# Patient Record
Sex: MALE | Race: WHITE | NOT HISPANIC OR LATINO | URBAN - METROPOLITAN AREA
[De-identification: names, ages, dates, MRNs, and addresses within clinical notes are randomized per-mention and may not be internally consistent; named-entity substitution may affect disease eponyms.]

---

## 2017-03-10 ENCOUNTER — OFFICE (AMBULATORY)
Dept: URBAN - METROPOLITAN AREA CLINIC 64 | Facility: CLINIC | Age: 57
End: 2017-03-10

## 2017-03-10 VITALS
DIASTOLIC BLOOD PRESSURE: 70 MMHG | WEIGHT: 186 LBS | SYSTOLIC BLOOD PRESSURE: 134 MMHG | HEART RATE: 93 BPM | HEIGHT: 70 IN

## 2017-03-10 DIAGNOSIS — K22.70 BARRETT'S ESOPHAGUS WITHOUT DYSPLASIA: ICD-10-CM

## 2017-03-10 DIAGNOSIS — R13.10 DYSPHAGIA, UNSPECIFIED: ICD-10-CM

## 2017-03-10 DIAGNOSIS — K21.9 GASTRO-ESOPHAGEAL REFLUX DISEASE WITHOUT ESOPHAGITIS: ICD-10-CM

## 2017-03-10 PROCEDURE — 99214 OFFICE O/P EST MOD 30 MIN: CPT | Performed by: NURSE PRACTITIONER

## 2017-03-10 RX ORDER — METOCLOPRAMIDE 5 MG/1
TABLET ORAL
Qty: 30 | Refills: 2 | Status: COMPLETED
Start: 2017-03-10 | End: 2017-03-14

## 2017-03-14 ENCOUNTER — ON CAMPUS - OUTPATIENT (AMBULATORY)
Dept: URBAN - METROPOLITAN AREA HOSPITAL 2 | Facility: HOSPITAL | Age: 57
End: 2017-03-14

## 2017-03-14 ENCOUNTER — OFFICE (AMBULATORY)
Dept: URBAN - METROPOLITAN AREA CLINIC 64 | Facility: CLINIC | Age: 57
End: 2017-03-14

## 2017-03-14 VITALS
SYSTOLIC BLOOD PRESSURE: 106 MMHG | DIASTOLIC BLOOD PRESSURE: 80 MMHG | TEMPERATURE: 97.9 F | SYSTOLIC BLOOD PRESSURE: 125 MMHG | OXYGEN SATURATION: 98 % | HEART RATE: 77 BPM | OXYGEN SATURATION: 95 % | HEART RATE: 75 BPM | HEART RATE: 69 BPM | SYSTOLIC BLOOD PRESSURE: 148 MMHG | HEART RATE: 74 BPM | RESPIRATION RATE: 16 BRPM | HEIGHT: 70 IN | OXYGEN SATURATION: 96 % | DIASTOLIC BLOOD PRESSURE: 83 MMHG | DIASTOLIC BLOOD PRESSURE: 85 MMHG | RESPIRATION RATE: 18 BRPM | WEIGHT: 183 LBS | HEART RATE: 63 BPM | DIASTOLIC BLOOD PRESSURE: 72 MMHG | SYSTOLIC BLOOD PRESSURE: 130 MMHG | DIASTOLIC BLOOD PRESSURE: 69 MMHG | OXYGEN SATURATION: 100 % | SYSTOLIC BLOOD PRESSURE: 127 MMHG

## 2017-03-14 DIAGNOSIS — K21.0 GASTRO-ESOPHAGEAL REFLUX DISEASE WITH ESOPHAGITIS: ICD-10-CM

## 2017-03-14 DIAGNOSIS — R13.10 DYSPHAGIA, UNSPECIFIED: ICD-10-CM

## 2017-03-14 DIAGNOSIS — K22.70 BARRETT'S ESOPHAGUS WITHOUT DYSPLASIA: ICD-10-CM

## 2017-03-14 DIAGNOSIS — K31.89 OTHER DISEASES OF STOMACH AND DUODENUM: ICD-10-CM

## 2017-03-14 LAB
GI HISTOLOGY: A. UNSPECIFIED: (no result)
GI HISTOLOGY: PDF REPORT: (no result)

## 2017-03-14 PROCEDURE — 43239 EGD BIOPSY SINGLE/MULTIPLE: CPT | Performed by: INTERNAL MEDICINE

## 2017-03-14 PROCEDURE — 88305 TISSUE EXAM BY PATHOLOGIST: CPT | Performed by: INTERNAL MEDICINE

## 2017-03-14 RX ORDER — RANITIDINE 300 MG/1
TABLET ORAL
Qty: 90 | Refills: 3 | Status: COMPLETED
End: 2017-04-21

## 2017-03-14 RX ORDER — METOCLOPRAMIDE 5 MG/1
TABLET ORAL
Qty: 30 | Refills: 2 | Status: COMPLETED
Start: 2017-03-10 | End: 2017-03-14

## 2017-03-14 RX ADMIN — PROPOFOL: 10 INJECTION, EMULSION INTRAVENOUS at 13:12

## 2017-04-21 ENCOUNTER — OFFICE (AMBULATORY)
Dept: URBAN - METROPOLITAN AREA CLINIC 64 | Facility: CLINIC | Age: 57
End: 2017-04-21

## 2017-04-21 VITALS
HEIGHT: 70 IN | DIASTOLIC BLOOD PRESSURE: 80 MMHG | HEART RATE: 88 BPM | WEIGHT: 188 LBS | SYSTOLIC BLOOD PRESSURE: 124 MMHG

## 2017-04-21 DIAGNOSIS — K22.70 BARRETT'S ESOPHAGUS WITHOUT DYSPLASIA: ICD-10-CM

## 2017-04-21 DIAGNOSIS — K21.9 GASTRO-ESOPHAGEAL REFLUX DISEASE WITHOUT ESOPHAGITIS: ICD-10-CM

## 2017-04-21 PROCEDURE — 99212 OFFICE O/P EST SF 10 MIN: CPT | Performed by: NURSE PRACTITIONER

## 2017-10-03 ENCOUNTER — HOSPITAL ENCOUNTER (OUTPATIENT)
Dept: FAMILY MEDICINE CLINIC | Facility: CLINIC | Age: 57
Setting detail: SPECIMEN
Discharge: HOME | End: 2017-10-03
Attending: HOSPITALIST | Admitting: HOSPITALIST

## 2017-10-03 LAB
ALBUMIN SERPL-MCNC: 4.4 G/DL (ref 3.5–4.8)
ALBUMIN/GLOB SERPL: 1.7 {RATIO} (ref 1–1.7)
ALP SERPL-CCNC: 67 IU/L (ref 32–91)
ALT SERPL-CCNC: 21 IU/L (ref 17–63)
ANION GAP SERPL CALC-SCNC: 10.8 MMOL/L (ref 10–20)
AST SERPL-CCNC: 21 IU/L (ref 15–41)
BASOPHILS # BLD AUTO: 0.1 10*3/UL (ref 0–0.2)
BASOPHILS NFR BLD AUTO: 1 % (ref 0–2)
BILIRUB SERPL-MCNC: 0.6 MG/DL (ref 0.3–1.2)
BUN SERPL-MCNC: 10 MG/DL (ref 8–20)
BUN/CREAT SERPL: 10 (ref 6.2–20.3)
CALCIUM SERPL-MCNC: 9.2 MG/DL (ref 8.9–10.3)
CHLORIDE SERPL-SCNC: 104 MMOL/L (ref 101–111)
CHOLEST SERPL-MCNC: 172 MG/DL
CHOLEST/HDLC SERPL: 4.5 {RATIO}
CONV CO2: 27 MMOL/L (ref 22–32)
CONV LDL CHOLESTEROL DIRECT: 106 MG/DL (ref 0–100)
CONV TOTAL PROTEIN: 7 G/DL (ref 6.1–7.9)
CREAT UR-MCNC: 1 MG/DL (ref 0.7–1.2)
DIFFERENTIAL METHOD BLD: (no result)
EOSINOPHIL # BLD AUTO: 0.1 10*3/UL (ref 0–0.3)
EOSINOPHIL # BLD AUTO: 2 % (ref 0–3)
ERYTHROCYTE [DISTWIDTH] IN BLOOD BY AUTOMATED COUNT: 12.7 % (ref 11.5–14.5)
FOLATE SERPL-MCNC: 21.2 NG/ML (ref 5.9–24.8)
GLOBULIN UR ELPH-MCNC: 2.6 G/DL (ref 2.5–3.8)
GLUCOSE SERPL-MCNC: 91 MG/DL (ref 65–99)
HCT VFR BLD AUTO: 47.5 % (ref 40–54)
HDLC SERPL-MCNC: 38 MG/DL
HGB BLD-MCNC: 16.3 G/DL (ref 14–18)
LDLC/HDLC SERPL: 2.8 {RATIO}
LIPID INTERPRETATION: ABNORMAL
LYMPHOCYTES # BLD AUTO: 2.3 10*3/UL (ref 0.8–4.8)
LYMPHOCYTES NFR BLD AUTO: 30 % (ref 18–42)
MCH RBC QN AUTO: 32.5 PG (ref 26–32)
MCHC RBC AUTO-ENTMCNC: 34.3 G/DL (ref 32–36)
MCV RBC AUTO: 94.9 FL (ref 80–94)
MONOCYTES # BLD AUTO: 0.8 10*3/UL (ref 0.1–1.3)
MONOCYTES NFR BLD AUTO: 10 % (ref 2–11)
NEUTROPHILS # BLD AUTO: 4.3 10*3/UL (ref 2.3–8.6)
NEUTROPHILS NFR BLD AUTO: 57 % (ref 50–75)
NRBC BLD AUTO-RTO: 0 /100{WBCS}
NRBC/RBC NFR BLD MANUAL: 0 10*3/UL
PLATELET # BLD AUTO: 157 10*3/UL (ref 150–450)
PMV BLD AUTO: 9.5 FL (ref 7.4–10.4)
POTASSIUM SERPL-SCNC: 3.8 MMOL/L (ref 3.6–5.1)
RBC # BLD AUTO: 5.01 10*6/UL (ref 4.6–6)
SODIUM SERPL-SCNC: 138 MMOL/L (ref 136–144)
T4 FREE SERPL-MCNC: 0.81 NG/DL (ref 0.58–1.64)
TRIGL SERPL-MCNC: 251 MG/DL
TSH SERPL-ACNC: 1.11 UIU/ML (ref 0.34–5.6)
VIT B12 SERPL-MCNC: 225 PG/ML (ref 180–914)
VLDLC SERPL CALC-MCNC: 28.1 MG/DL
WBC # BLD AUTO: 7.5 10*3/UL (ref 4.5–11.5)

## 2018-04-05 ENCOUNTER — OFFICE (AMBULATORY)
Dept: URBAN - METROPOLITAN AREA CLINIC 64 | Facility: CLINIC | Age: 58
End: 2018-04-05

## 2018-04-05 VITALS
WEIGHT: 178 LBS | HEART RATE: 59 BPM | SYSTOLIC BLOOD PRESSURE: 149 MMHG | HEIGHT: 70 IN | DIASTOLIC BLOOD PRESSURE: 77 MMHG

## 2018-04-05 DIAGNOSIS — K22.70 BARRETT'S ESOPHAGUS WITHOUT DYSPLASIA: ICD-10-CM

## 2018-04-05 DIAGNOSIS — K21.9 GASTRO-ESOPHAGEAL REFLUX DISEASE WITHOUT ESOPHAGITIS: ICD-10-CM

## 2018-04-05 PROCEDURE — 99213 OFFICE O/P EST LOW 20 MIN: CPT | Performed by: NURSE PRACTITIONER

## 2018-04-05 RX ORDER — ESOMEPRAZOLE MAGNESIUM 40 MG/1
40 CAPSULE, DELAYED RELEASE ORAL
Qty: 90 | Refills: 3 | Status: COMPLETED
Start: 2018-04-05 | End: 2022-11-01

## 2018-10-01 ENCOUNTER — HOSPITAL ENCOUNTER (OUTPATIENT)
Dept: MRI IMAGING | Facility: HOSPITAL | Age: 58
Discharge: HOME | End: 2018-10-01
Attending: HOSPITALIST | Admitting: HOSPITALIST

## 2018-10-15 ENCOUNTER — HOSPITAL ENCOUNTER (OUTPATIENT)
Dept: PHYSICAL THERAPY | Facility: HOSPITAL | Age: 58
Setting detail: RECURRING SERIES
Discharge: HOME | End: 2018-10-17
Attending: HOSPITALIST | Admitting: HOSPITALIST

## 2019-04-19 ENCOUNTER — OFFICE (AMBULATORY)
Dept: URBAN - METROPOLITAN AREA CLINIC 64 | Facility: CLINIC | Age: 59
End: 2019-04-19

## 2019-04-19 VITALS
DIASTOLIC BLOOD PRESSURE: 77 MMHG | WEIGHT: 180 LBS | HEIGHT: 70 IN | HEART RATE: 72 BPM | SYSTOLIC BLOOD PRESSURE: 137 MMHG

## 2019-04-19 DIAGNOSIS — K22.70 BARRETT'S ESOPHAGUS WITHOUT DYSPLASIA: ICD-10-CM

## 2019-04-19 DIAGNOSIS — K21.9 GASTRO-ESOPHAGEAL REFLUX DISEASE WITHOUT ESOPHAGITIS: ICD-10-CM

## 2019-04-19 DIAGNOSIS — Z13.820 ENCOUNTER FOR SCREENING FOR OSTEOPOROSIS: ICD-10-CM

## 2019-04-19 PROCEDURE — 99213 OFFICE O/P EST LOW 20 MIN: CPT | Performed by: NURSE PRACTITIONER

## 2019-04-19 RX ORDER — RANITIDINE 300 MG/1
TABLET ORAL
Qty: 90 | Refills: 3 | Status: COMPLETED
End: 2020-10-15

## 2019-04-19 RX ORDER — ESOMEPRAZOLE MAGNESIUM 40 MG/1
40 CAPSULE, DELAYED RELEASE ORAL
Qty: 90 | Refills: 3 | Status: COMPLETED
Start: 2018-04-05 | End: 2022-11-01

## 2019-05-28 ENCOUNTER — HOSPITAL ENCOUNTER (OUTPATIENT)
Dept: FAMILY MEDICINE CLINIC | Facility: CLINIC | Age: 59
Setting detail: SPECIMEN
Discharge: HOME | End: 2019-05-28
Attending: HOSPITALIST | Admitting: HOSPITALIST

## 2019-05-28 ENCOUNTER — CONVERSION ENCOUNTER (OUTPATIENT)
Dept: FAMILY MEDICINE CLINIC | Facility: CLINIC | Age: 59
End: 2019-05-28

## 2019-05-28 LAB
ALBUMIN SERPL-MCNC: 4 G/DL (ref 3.5–4.8)
ALBUMIN/GLOB SERPL: 1.4 {RATIO} (ref 1–1.7)
ALP SERPL-CCNC: 73 IU/L (ref 32–91)
ALT SERPL-CCNC: 38 IU/L (ref 17–63)
ANION GAP SERPL CALC-SCNC: 15.4 MMOL/L (ref 10–20)
AST SERPL-CCNC: 28 IU/L (ref 15–41)
BASOPHILS # BLD AUTO: 0 10*3/UL (ref 0–0.2)
BASOPHILS NFR BLD AUTO: 1 % (ref 0–2)
BILIRUB SERPL-MCNC: 0.8 MG/DL (ref 0.3–1.2)
BUN SERPL-MCNC: 17 MG/DL (ref 8–20)
BUN/CREAT SERPL: 17 (ref 6.2–20.3)
CALCIUM SERPL-MCNC: 9 MG/DL (ref 8.9–10.3)
CHLORIDE SERPL-SCNC: 103 MMOL/L (ref 101–111)
CHOLEST SERPL-MCNC: 129 MG/DL
CHOLEST/HDLC SERPL: 4.5 {RATIO}
CONV CO2: 24 MMOL/L (ref 22–32)
CONV LDL CHOLESTEROL DIRECT: 79 MG/DL (ref 0–100)
CONV TOTAL PROTEIN: 6.8 G/DL (ref 6.1–7.9)
CREAT UR-MCNC: 1 MG/DL (ref 0.7–1.2)
DIFFERENTIAL METHOD BLD: (no result)
EOSINOPHIL # BLD AUTO: 0.1 10*3/UL (ref 0–0.3)
EOSINOPHIL # BLD AUTO: 1 % (ref 0–3)
ERYTHROCYTE [DISTWIDTH] IN BLOOD BY AUTOMATED COUNT: 12.4 % (ref 11.5–14.5)
FOLATE SERPL-MCNC: >24.8 NG/ML (ref 5.9–24.8)
GLOBULIN UR ELPH-MCNC: 2.8 G/DL (ref 2.5–3.8)
GLUCOSE SERPL-MCNC: 85 MG/DL (ref 65–99)
HCT VFR BLD AUTO: 43 % (ref 40–54)
HDLC SERPL-MCNC: 29 MG/DL
HGB BLD-MCNC: 14.9 G/DL (ref 14–18)
LDLC/HDLC SERPL: 2.7 {RATIO}
LIPID INTERPRETATION: ABNORMAL
LYMPHOCYTES # BLD AUTO: 1.9 10*3/UL (ref 0.8–4.8)
LYMPHOCYTES NFR BLD AUTO: 40 % (ref 18–42)
MCH RBC QN AUTO: 32.7 PG (ref 26–32)
MCHC RBC AUTO-ENTMCNC: 34.6 G/DL (ref 32–36)
MCV RBC AUTO: 94.4 FL (ref 80–94)
MONOCYTES # BLD AUTO: 0.7 10*3/UL (ref 0.1–1.3)
MONOCYTES NFR BLD AUTO: 14 % (ref 2–11)
NEUTROPHILS # BLD AUTO: 2.1 10*3/UL (ref 2.3–8.6)
NEUTROPHILS NFR BLD AUTO: 44 % (ref 50–75)
NRBC BLD AUTO-RTO: 0 /100{WBCS}
NRBC/RBC NFR BLD MANUAL: 0 10*3/UL
PLATELET # BLD AUTO: 164 10*3/UL (ref 150–450)
PMV BLD AUTO: 9.4 FL (ref 7.4–10.4)
POTASSIUM SERPL-SCNC: 4.4 MMOL/L (ref 3.6–5.1)
PSA SERPL-MCNC: 0.91 NG/ML (ref 0–4)
RBC # BLD AUTO: 4.56 10*6/UL (ref 4.6–6)
SODIUM SERPL-SCNC: 138 MMOL/L (ref 136–144)
TRIGL SERPL-MCNC: 142 MG/DL
TSH SERPL-ACNC: 1.75 UIU/ML (ref 0.34–5.6)
VIT B12 SERPL-MCNC: 236 PG/ML (ref 180–914)
VLDLC SERPL CALC-MCNC: 21.5 MG/DL
WBC # BLD AUTO: 4.8 10*3/UL (ref 4.5–11.5)

## 2019-06-04 VITALS
RESPIRATION RATE: 8 BRPM | WEIGHT: 175 LBS | HEART RATE: 60 BPM | SYSTOLIC BLOOD PRESSURE: 130 MMHG | DIASTOLIC BLOOD PRESSURE: 88 MMHG

## 2019-06-06 NOTE — PROGRESS NOTES
Vital Signs:    Patient Profile:    59 Years Old Male  Height:     70 inches  Weight:     175 pounds  BMI:        25.11     Temp:       98.0 degrees F oral  Pulse rate: 60 / minute  Pulse rhythm:   regular  Resp:       8 per minute  BP Sittin / 88  (right arm)    Cuff size:  large      Problems: Active problems were reviewed with the patient during this visit.  Medications: Medications were reviewed with the patient during this visit.  Allergies: Allergies were reviewed with the patient during this visit.  No Known Allergy.        Vitals Entered By: [facility]      Visit Type:  Annual Physical  Referring Provider:  Zoran Ontiveros MD  Primary Provider:  Zoran Ontiveros MD      History of Present Illness:         The patient comes in today for a Annual exam.  Here for physical. Doing well, glaucoma worse and on drops.           When questioned about any new concerns, the patient voices concerns with nothing in particular.  When asked about their health maintenence items, they report their Colonoscopy is UTD and PSA is UTD.           The ROS is negative for chest pain, SOA, fatique, dizzyness, headaches, fever, nausea and vomiting.        Past Medical History:     Reviewed history from 2016 and no changes required:        Musculoskeletal Hx: polio        Benign prostatic hypertrophy        Hyperlipidemia        GERD        Peptic ulcer disease        Allergies        Polio as a child        glaucoma    Family History Summary:      Reviewed history Last on 2018 and no changes required:2019      General Comments - FH:  FH Heart Disease  Brain atrophy in brother diseased    Social History:     Reviewed history from 10/03/2017 and no changes required:        Patient has never smoked.        Alcohol Use: Y        Drug Use: N                Previous Tobacco Use: Signed On - 2018  Smoked Tobacco Use:  Never smoker  Drug use:  no  Caffeine use:  1 drinks per day    Previous Alcohol Use:  Signed On - 09/25/2018  Alcohol use:  yes     Type:  social    Colonoscopy History:     Date of Last Colonoscopy:  05/20/2016        Physical Exam   Weight:  177  BP:  130/88 mm HG    Medication List:  FOSAMAX 70 MG ORAL TABLET (ALENDRONATE SODIUM) take one weekly  MELOXICAM 15MG TABLETS (MELOXICAM) TAKE 1 TABLET BY MOUTH DAILY  VITAMIN D (ERGOCALCIFEROL) 83962 UNIT ORAL CAPSULE (ERGOCALCIFEROL) one tablet by mouth weekly  ALLEGRA ALLERGY 180 MG ORAL TABLET (FEXOFENADINE HCL) Take 1 tablet by mouth daily  MONTELUKAST 10MG TABLETS (MONTELUKAST SODIUM) TAKE 1 TABLET BY MOUTH EVERY DAY  ATORVASTATIN 20MG TABLETS (ATORVASTATIN CALCIUM) TAKE 1 TABLET BY MOUTH DAILY        Risk Factors  Tobacco Use: Never smoker  Illicit Drug use: no      Physical Examination   General Appearance   In no acute distress.  Alert & oriented.  Behavior and affect appropriate to situation  Skin   No suspicious lesions, moles or rashes . Turgor good.  HEENT   PERRLA, EOMI, TM's normal.  Pharynx clear  Neck   Supple.  No adenopathy  Cardiovascular   Regular rate and rhythm  Lungs   Clear to auscultation  Abdomen   Soft, non-tender.  Bowel sounds present.  No hepatosplenomegaly  Back   degenerative changes  Musculoskeletal   OA changes, degenerative changes  Neurologic   CN grossly intact  Psych   Alert and cooperative; normal mood and affect; normal attention span and concentration        Impression & Recommendations:    Problem # 1:  Preventive Health Care Exam (ICD-V70.0) (QSZ23-W19.00)  This patient overall looks good related to their annual checkup.  Problem areas were addressed and they were encouraged to continue good lifestyle habits and behaviors.    Orders:  [facility] CBC W/DIFF; PATH REVIEW IF INDICATED (CBC)  [facility] LIPID PANEL (LIPID)  [facility] PSA TOTAL (SCREENING) (PSA)  [facility] COMPREHENSIVE METABOLIC PANEL (CMP) (MPC)  [facility] VITAMIN B12 (B12)  [facility] FOLATE (FOL)  [facility] THYROID STIMULATING HORMONE (TSH) (TSH)      Problem # 2:  HYPERLIPIDEMIA (ICD-272.4)  (MYG96-E01.5)    His updated medication list for this problem includes:     Atorvastatin 20mg Tablets (Atorvastatin calcium) ..... Take 1 tablet by mouth daily    Labs Reviewed:  Chol: 172 (10/03/2017)   HDL: 38 (10/03/2017)   LDL: 106 (10/03/2017)     SGOT: 21 (10/03/2017)   SGPT: 21 (10/03/2017)    Orders:  [facility] CBC W/DIFF; PATH REVIEW IF INDICATED (CBC)  [facility] LIPID PANEL (LIPID)  [facility] PSA TOTAL (SCREENING) (PSA)  [facility] COMPREHENSIVE METABOLIC PANEL (CMP) (MPC)  [facility] VITAMIN B12 (B12)  [facility] FOLATE (FOL)  [facility] THYROID STIMULATING HORMONE (TSH) (TSH)      Problem # 3:  GERD (ICD-530.11) (BEM66-E24.0)  continue meds    Problem # 4:  Beltran's esophagus, hx of (ICD-V12.79) (WZZ39-D62.19)    Orders:  [facility] CBC W/DIFF; PATH REVIEW IF INDICATED (CBC)  [facility] LIPID PANEL (LIPID)  [facility] PSA TOTAL (SCREENING) (PSA)  [facility] COMPREHENSIVE METABOLIC PANEL (CMP) (MPC)  [facility] VITAMIN B12 (B12)  [facility] FOLATE (FOL)  [facility] THYROID STIMULATING HORMONE (TSH) (TSH)                    Medication Administration    Orders Added:  1)  Preventive, Est, (40-64) [21245]  2)  [facility] CBC W/DIFF; PATH REVIEW IF INDICATED [CBC]  3)  [facility] LIPID PANEL [LIPID]  4)  [facility] PSA TOTAL (SCREENING) [PSA]  5)  [facility] COMPREHENSIVE METABOLIC PANEL (CMP) [MPC]  6)  [facility] VITAMIN B12 [B12]  7)  [facility] FOLATE [FOL]  8)  [facility] THYROID STIMULATING HORMONE (TSH) [TSH]        Electronically signed by Zoran Ontiveros MD on 05/28/2019 at 3:33 PM  ________________________________________________________________________       Disclaimer: Converted Note message may not contain all data elements that existed in the legacy source system. Please see [employer] for the original note details.

## 2019-07-25 ENCOUNTER — OFFICE VISIT (OUTPATIENT)
Dept: FAMILY MEDICINE CLINIC | Facility: CLINIC | Age: 59
End: 2019-07-25

## 2019-07-25 VITALS
WEIGHT: 174.4 LBS | DIASTOLIC BLOOD PRESSURE: 80 MMHG | OXYGEN SATURATION: 97 % | HEIGHT: 70 IN | RESPIRATION RATE: 16 BRPM | TEMPERATURE: 98.1 F | SYSTOLIC BLOOD PRESSURE: 112 MMHG | HEART RATE: 73 BPM | BODY MASS INDEX: 24.97 KG/M2

## 2019-07-25 DIAGNOSIS — M25.512 ACUTE PAIN OF LEFT SHOULDER: Primary | ICD-10-CM

## 2019-07-25 PROCEDURE — 99213 OFFICE O/P EST LOW 20 MIN: CPT | Performed by: NURSE PRACTITIONER

## 2019-07-25 RX ORDER — MONTELUKAST SODIUM 10 MG/1
TABLET ORAL EVERY 24 HOURS
COMMUNITY
Start: 2018-04-26

## 2019-07-25 RX ORDER — LATANOPROST 50 UG/ML
SOLUTION/ DROPS OPHTHALMIC
COMMUNITY
Start: 2019-07-24

## 2019-07-25 RX ORDER — FEXOFENADINE HCL 180 MG/1
TABLET ORAL
COMMUNITY
Start: 2016-02-09

## 2019-07-25 RX ORDER — ALENDRONATE SODIUM 70 MG/1
70 TABLET ORAL
COMMUNITY
End: 2019-10-27

## 2019-07-25 RX ORDER — METHYLPREDNISOLONE 4 MG/1
TABLET ORAL
Qty: 21 TABLET | Refills: 0 | Status: SHIPPED | OUTPATIENT
Start: 2019-07-25 | End: 2019-10-27

## 2019-07-25 RX ORDER — MELOXICAM 15 MG/1
TABLET ORAL EVERY 24 HOURS
COMMUNITY
Start: 2019-04-22 | End: 2019-07-25

## 2019-07-25 RX ORDER — DICLOFENAC SODIUM 75 MG/1
75 TABLET, DELAYED RELEASE ORAL 2 TIMES DAILY
Qty: 180 TABLET | Refills: 1 | Status: SHIPPED | OUTPATIENT
Start: 2019-07-25 | End: 2020-01-21

## 2019-07-25 RX ORDER — RANITIDINE 150 MG/1
150 TABLET ORAL 2 TIMES DAILY
COMMUNITY

## 2019-07-25 RX ORDER — ATORVASTATIN CALCIUM 20 MG/1
20 TABLET, FILM COATED ORAL DAILY
Refills: 0 | COMMUNITY
Start: 2019-07-21 | End: 2019-11-18 | Stop reason: SDUPTHER

## 2019-07-25 RX ORDER — ESOMEPRAZOLE MAGNESIUM 40 MG/1
CAPSULE, DELAYED RELEASE ORAL
Refills: 3 | COMMUNITY
Start: 2019-04-19

## 2019-07-25 NOTE — PROGRESS NOTES
"Ines Hernandez is a 59 y.o. male.     Chief Complaint   Patient presents with   • Shoulder Pain     left       /80 (BP Location: Left arm, Patient Position: Sitting, Cuff Size: Adult)   Pulse 73   Temp 98.1 °F (36.7 °C) (Oral)   Resp 16   Ht 177.8 cm (70\")   Wt 79.1 kg (174 lb 6.4 oz)   SpO2 97%   BMI 25.02 kg/m²     BP Readings from Last 3 Encounters:   07/25/19 112/80   05/28/19 130/88   09/25/18 140/90       Wt Readings from Last 3 Encounters:   07/25/19 79.1 kg (174 lb 6.4 oz)   05/28/19 79.4 kg (174 lb 16 oz)   09/25/18 80.3 kg (176 lb 16 oz)       Pt is coming in today with c/o left shoulder pain x 3 months or so. No specific injuries. Stretching and lifting makes pain worse.  Some limitation with ROM.   Had similar symptoms on right shoulder last fall and was started on mobic and that helped. Restarted taking it about 1 month ago, but stopped it about 2 weeks ago. Didn't notice much relief while taking it.  When he was seen last fall he was referred to Dr. Reina, but never saw him. Had MRI of right shoulder and it showed some small tears.  No numbness or tingling in arm.   Pain is not constant  Pain is 5/10       The following portions of the patient's history were reviewed and updated as appropriate: allergies, current medications, past family history, past medical history, past social history, past surgical history and problem list.    Review of Systems   Musculoskeletal: Positive for arthralgias and bursitis. Negative for neck pain and neck stiffness.   Neurological: Negative for numbness.       Objective   Physical Exam   Constitutional: He is oriented to person, place, and time. He appears well-developed and well-nourished.   Eyes: Pupils are equal, round, and reactive to light.   Cardiovascular: Normal rate and regular rhythm.   Pulmonary/Chest: Effort normal and breath sounds normal.   Musculoskeletal:        Left shoulder: He exhibits decreased range of motion and " tenderness. He exhibits no bony tenderness, no swelling and no crepitus.   Neurological: He is alert and oriented to person, place, and time.       Assessment/Plan   Monie was seen today for shoulder pain.    Diagnoses and all orders for this visit:    Acute pain of left shoulder  -     methylPREDNISolone (MEDROL, ANJELICA,) 4 MG tablet; Take as directed on package instructions.  -     diclofenac (VOLTAREN) 75 MG EC tablet; Take 1 tablet by mouth 2 (Two) Times a Day for 180 days.    During this office visit, we discussed the pertinent aspects of the visit and treatment recommendations. Pt verbalizes understanding. Follow up was discussed. Patient was given the opportunity to ask questions and discuss other concerns.

## 2019-07-25 NOTE — PATIENT INSTRUCTIONS
Medrol dose pack and then start diclofenac twice a day  If no improvement will get MRI, then poss referral to ortho (Dr. Reina)      Shoulder Pain  Many things can cause shoulder pain, including:  · An injury to the area.  · Overuse of the shoulder.  · Arthritis.    The source of the pain can be:  · Inflammation.  · An injury to the shoulder joint.  · An injury to a tendon, ligament, or bone.    Follow these instructions at home:  Take these actions to help with your pain:  · Squeeze a soft ball or a foam pad as much as possible. This helps to keep the shoulder from swelling. It also helps to strengthen the arm.  · Take over-the-counter and prescription medicines only as told by your health care provider.  · If directed, apply ice to the area:  ? Put ice in a plastic bag.  ? Place a towel between your skin and the bag.  ? Leave the ice on for 20 minutes, 2-3 times per day. Stop applying ice if it does not help with the pain.  · If you were given a shoulder sling or immobilizer:  ? Wear it as told.  ? Remove it to shower or bathe.  ? Move your arm as little as possible, but keep your hand moving to prevent swelling.    Contact a health care provider if:  · Your pain gets worse.  · Your pain is not relieved with medicines.  · New pain develops in your arm, hand, or fingers.  Get help right away if:  · Your arm, hand, or fingers:  ? Tingle.  ? Become numb.  ? Become swollen.  ? Become painful.  ? Turn white or blue.  This information is not intended to replace advice given to you by your health care provider. Make sure you discuss any questions you have with your health care provider.  Document Released: 09/27/2006 Document Revised: 08/13/2017 Document Reviewed: 04/11/2016  [employer] Interactive Patient Education © 2019 [employer]

## 2019-11-18 RX ORDER — ATORVASTATIN CALCIUM 20 MG/1
TABLET, FILM COATED ORAL
Qty: 90 TABLET | Refills: 0 | Status: SHIPPED | OUTPATIENT
Start: 2019-11-18 | End: 2020-02-11 | Stop reason: SDUPTHER

## 2020-02-11 RX ORDER — ATORVASTATIN CALCIUM 20 MG/1
20 TABLET, FILM COATED ORAL DAILY
Qty: 90 TABLET | Refills: 0 | Status: SHIPPED | OUTPATIENT
Start: 2020-02-11 | End: 2020-05-18

## 2020-04-15 ENCOUNTER — OFFICE VISIT (OUTPATIENT)
Dept: FAMILY MEDICINE CLINIC | Facility: CLINIC | Age: 60
End: 2020-04-15

## 2020-04-15 VITALS
DIASTOLIC BLOOD PRESSURE: 84 MMHG | TEMPERATURE: 98 F | BODY MASS INDEX: 26.92 KG/M2 | OXYGEN SATURATION: 98 % | WEIGHT: 188 LBS | RESPIRATION RATE: 8 BRPM | HEIGHT: 70 IN | SYSTOLIC BLOOD PRESSURE: 140 MMHG | HEART RATE: 90 BPM

## 2020-04-15 DIAGNOSIS — M85.80 OSTEOPENIA, UNSPECIFIED LOCATION: ICD-10-CM

## 2020-04-15 DIAGNOSIS — R10.11 RUQ PAIN: Primary | ICD-10-CM

## 2020-04-15 PROCEDURE — 99213 OFFICE O/P EST LOW 20 MIN: CPT | Performed by: NURSE PRACTITIONER

## 2020-04-15 RX ORDER — ALENDRONATE SODIUM 70 MG/1
70 TABLET ORAL
COMMUNITY
End: 2020-04-15 | Stop reason: SDUPTHER

## 2020-04-15 RX ORDER — ALENDRONATE SODIUM 70 MG/1
70 TABLET ORAL
Qty: 12 TABLET | Refills: 0 | Status: SHIPPED | OUTPATIENT
Start: 2020-04-15 | End: 2020-06-30

## 2020-04-15 NOTE — PROGRESS NOTES
"Ines Hernandez is a 60 y.o. male.     Chief Complaint   Patient presents with   • gallblader       /84 (BP Location: Left arm, Patient Position: Sitting, Cuff Size: Large Adult)   Pulse 90   Temp 98 °F (36.7 °C) (Oral)   Resp 8   Ht 177.8 cm (70\")   Wt 85.3 kg (188 lb)   SpO2 98%   BMI 26.98 kg/m²     BP Readings from Last 3 Encounters:   04/15/20 140/84   10/27/19 180/69   07/25/19 112/80       Wt Readings from Last 3 Encounters:   04/15/20 85.3 kg (188 lb)   10/27/19 79.7 kg (175 lb 12.8 oz)   07/25/19 79.1 kg (174 lb 6.4 oz)       Pt comes in today with c/o poss gallbladder issues.  Has been having dull pain in RUQ for about 6 months. However, he has had 2 significant attacks in the past 2 months. Most recent \"attack\" was on 3/28 and lasted about 3 hours. Prior to attack he had eaten pizza. No N/V. Pain was about 7-8/10. Nothing improved it. No bowel changes. Pain radiated up to neck.     Pt is also needing refill on fosamax weekly. Dexa scan was done about 9 months ago.        The following portions of the patient's history were reviewed and updated as appropriate: allergies, current medications, past family history, past medical history, past social history, past surgical history and problem list.    Review of Systems   Gastrointestinal: Positive for abdominal pain. Negative for abdominal distention, constipation, diarrhea, nausea, vomiting, GERD and indigestion.       Objective   Physical Exam   Constitutional: He is oriented to person, place, and time. He appears well-developed and well-nourished.   Eyes: Pupils are equal, round, and reactive to light.   Cardiovascular: Normal rate and regular rhythm.   Pulmonary/Chest: Effort normal and breath sounds normal.   Abdominal: Soft. Normal appearance and bowel sounds are normal. There is tenderness in the right upper quadrant.   Neurological: He is alert and oriented to person, place, and time.         Diagnoses and all orders for this " visit:    1. RUQ pain (Primary)  -     US Gallbladder; Future    2. Osteopenia, unspecified location  -     alendronate (FOSAMAX) 70 MG tablet; Take 1 tablet by mouth Every 7 (Seven) Days.  Dispense: 12 tablet; Refill: 0      During this office visit, we discussed the pertinent aspects of the visit and treatment recommendations. Pt verbalizes understanding. Follow up was discussed. Patient was given the opportunity to ask questions and discuss other concerns.     Return if symptoms worsen or fail to improve.

## 2020-04-16 ENCOUNTER — TELEPHONE (OUTPATIENT)
Dept: FAMILY MEDICINE CLINIC | Facility: CLINIC | Age: 60
End: 2020-04-16

## 2020-04-16 NOTE — TELEPHONE ENCOUNTER
WAS INSTRUCTED BY COLE TO SEND A NOTE TO THE CLINICAL STAFF REGARDING THE STATUS OF THIS GALLBLADDER ULTRASOUND.     WIFE WAYNE WAS CALLING TO CHECK WHY THE ORDER HAS NOT BEEN FAXED TO THE FACILITY THAT HE IS GOING TO.      SIDE NOTE REFERRAL SAYS ROUTINE IS THIS SUPPOSED TO BE URGENT OR EMERGENT       WIFE WAYNE CAN BE REACHED [phone]

## 2020-04-28 DIAGNOSIS — R10.11 RUQ PAIN: ICD-10-CM

## 2020-04-29 ENCOUNTER — TELEPHONE (OUTPATIENT)
Dept: FAMILY MEDICINE CLINIC | Facility: CLINIC | Age: 60
End: 2020-04-29

## 2020-04-29 DIAGNOSIS — R10.11 RUQ PAIN: Primary | ICD-10-CM

## 2020-04-29 NOTE — PROGRESS NOTES
Please let pt know that there are some changes to both gallbladder and liver on US. I want to refer him to general surgery for the gallbladder and to [facility] for liver disease (fatty liver).   Refer to Dr. Villarreal (general surgery) and Dr. Hanna at [facility].

## 2020-04-29 NOTE — TELEPHONE ENCOUNTER
----- Message from JOBY Peralta sent at 4/29/2020  8:32 AM EDT -----  Please let pt know that there are some changes to both gallbladder and liver on US. I want to refer him to general surgery for the gallbladder and to [facility] for liver disease (fatty liver).   Refer to Dr. Villarreal (general surgery) and Dr. Hanna at [facility].

## 2020-04-30 ENCOUNTER — TELEHEALTH PROVIDED OTHER THAN IN PATIENT'S HOME (AMBULATORY)
Dept: URBAN - METROPOLITAN AREA TELEHEALTH 4 | Facility: TELEHEALTH | Age: 60
End: 2020-04-30

## 2020-04-30 VITALS — HEIGHT: 70 IN

## 2020-04-30 DIAGNOSIS — R10.11 RIGHT UPPER QUADRANT PAIN: ICD-10-CM

## 2020-04-30 DIAGNOSIS — K76.9 LIVER DISEASE, UNSPECIFIED: ICD-10-CM

## 2020-04-30 PROCEDURE — 99213 OFFICE O/P EST LOW 20 MIN: CPT | Mod: 95 | Performed by: INTERNAL MEDICINE

## 2020-05-18 RX ORDER — ATORVASTATIN CALCIUM 20 MG/1
20 TABLET, FILM COATED ORAL DAILY
Qty: 90 TABLET | Refills: 0 | Status: SHIPPED | OUTPATIENT
Start: 2020-05-18 | End: 2020-08-24

## 2020-06-30 DIAGNOSIS — M85.80 OSTEOPENIA, UNSPECIFIED LOCATION: ICD-10-CM

## 2020-06-30 RX ORDER — ALENDRONATE SODIUM 70 MG/1
TABLET ORAL
Qty: 12 TABLET | Refills: 0 | Status: SHIPPED | OUTPATIENT
Start: 2020-06-30

## 2020-08-24 RX ORDER — ATORVASTATIN CALCIUM 20 MG/1
20 TABLET, FILM COATED ORAL DAILY
Qty: 90 TABLET | Refills: 0 | Status: SHIPPED | OUTPATIENT
Start: 2020-08-24 | End: 2020-11-16

## 2020-10-15 ENCOUNTER — OFFICE (AMBULATORY)
Dept: URBAN - METROPOLITAN AREA CLINIC 64 | Facility: CLINIC | Age: 60
End: 2020-10-15

## 2020-10-15 VITALS
WEIGHT: 179 LBS | DIASTOLIC BLOOD PRESSURE: 88 MMHG | SYSTOLIC BLOOD PRESSURE: 121 MMHG | HEIGHT: 70 IN | HEART RATE: 93 BPM

## 2020-10-15 DIAGNOSIS — K21.9 GASTRO-ESOPHAGEAL REFLUX DISEASE WITHOUT ESOPHAGITIS: ICD-10-CM

## 2020-10-15 DIAGNOSIS — K76.9 LIVER DISEASE, UNSPECIFIED: ICD-10-CM

## 2020-10-15 DIAGNOSIS — K22.70 BARRETT'S ESOPHAGUS WITHOUT DYSPLASIA: ICD-10-CM

## 2020-10-15 PROCEDURE — 99213 OFFICE O/P EST LOW 20 MIN: CPT | Performed by: NURSE PRACTITIONER

## 2020-11-16 RX ORDER — ATORVASTATIN CALCIUM 20 MG/1
20 TABLET, FILM COATED ORAL DAILY
Qty: 90 TABLET | Refills: 0 | Status: SHIPPED | OUTPATIENT
Start: 2020-11-16

## 2021-02-21 RX ORDER — ATORVASTATIN CALCIUM 20 MG/1
20 TABLET, FILM COATED ORAL DAILY
Qty: 90 TABLET | Refills: 0 | OUTPATIENT
Start: 2021-02-21

## 2021-10-25 ENCOUNTER — OFFICE (AMBULATORY)
Dept: URBAN - METROPOLITAN AREA CLINIC 64 | Facility: CLINIC | Age: 61
End: 2021-10-25

## 2021-10-25 VITALS — SYSTOLIC BLOOD PRESSURE: 108 MMHG | DIASTOLIC BLOOD PRESSURE: 73 MMHG | HEIGHT: 70 IN | HEART RATE: 63 BPM

## 2021-10-25 DIAGNOSIS — K21.9 GASTRO-ESOPHAGEAL REFLUX DISEASE WITHOUT ESOPHAGITIS: ICD-10-CM

## 2021-10-25 PROCEDURE — 99213 OFFICE O/P EST LOW 20 MIN: CPT | Performed by: NURSE PRACTITIONER

## 2022-04-21 ENCOUNTER — OFFICE (AMBULATORY)
Dept: URBAN - METROPOLITAN AREA PATHOLOGY 4 | Facility: PATHOLOGY | Age: 62
End: 2022-04-21

## 2022-04-21 ENCOUNTER — ON CAMPUS - OUTPATIENT (AMBULATORY)
Dept: URBAN - METROPOLITAN AREA HOSPITAL 2 | Facility: HOSPITAL | Age: 62
End: 2022-04-21

## 2022-04-21 VITALS
OXYGEN SATURATION: 97 % | SYSTOLIC BLOOD PRESSURE: 136 MMHG | SYSTOLIC BLOOD PRESSURE: 107 MMHG | HEART RATE: 66 BPM | RESPIRATION RATE: 16 BRPM | HEART RATE: 72 BPM | OXYGEN SATURATION: 95 % | DIASTOLIC BLOOD PRESSURE: 71 MMHG | HEART RATE: 62 BPM | TEMPERATURE: 97.8 F | RESPIRATION RATE: 18 BRPM | OXYGEN SATURATION: 99 % | WEIGHT: 192 LBS | SYSTOLIC BLOOD PRESSURE: 132 MMHG | DIASTOLIC BLOOD PRESSURE: 78 MMHG | HEART RATE: 76 BPM | HEIGHT: 70 IN | DIASTOLIC BLOOD PRESSURE: 62 MMHG | SYSTOLIC BLOOD PRESSURE: 105 MMHG | RESPIRATION RATE: 17 BRPM | DIASTOLIC BLOOD PRESSURE: 80 MMHG | HEART RATE: 60 BPM | DIASTOLIC BLOOD PRESSURE: 73 MMHG

## 2022-04-21 DIAGNOSIS — K22.70 BARRETT'S ESOPHAGUS WITHOUT DYSPLASIA: ICD-10-CM

## 2022-04-21 LAB
GI HISTOLOGY: A. UNSPECIFIED: (no result)
GI HISTOLOGY: PDF REPORT: (no result)

## 2022-04-21 PROCEDURE — 88305 TISSUE EXAM BY PATHOLOGIST: CPT | Performed by: INTERNAL MEDICINE

## 2022-04-21 PROCEDURE — 43239 EGD BIOPSY SINGLE/MULTIPLE: CPT | Performed by: INTERNAL MEDICINE

## 2022-04-21 RX ORDER — FAMOTIDINE 40 MG/1
40 TABLET, FILM COATED ORAL
Qty: 90 | Refills: 3 | Status: COMPLETED
Start: 2022-04-21 | End: 2022-11-01

## 2022-04-21 RX ORDER — ESOMEPRAZOLE MAGNESIUM 40 MG/1
40 CAPSULE, DELAYED RELEASE ORAL
Qty: 90 | Refills: 3 | Status: COMPLETED
Start: 2022-04-21 | End: 2022-11-01

## 2022-10-24 NOTE — PATIENT INSTRUCTIONS
Gallbladder Eating Plan  If you have a gallbladder condition, you may have trouble digesting fats. Eating a low-fat diet can help reduce your symptoms, and may be helpful before and after having surgery to remove your gallbladder (cholecystectomy). Your health care provider may recommend that you work with a diet and nutrition specialist (dietitian) to help you reduce the amount of fat in your diet.  What are tips for following this plan?  General guidelines  · Limit your fat intake to less than 30% of your total daily calories. If you eat around 1,800 calories each day, this is less than 60 grams (g) of fat per day.  · Fat is an important part of a healthy diet. Eating a low-fat diet can make it hard to maintain a healthy body weight. Ask your dietitian how much fat, calories, and other nutrients you need each day.  · Eat small, frequent meals throughout the day instead of three large meals.  · Drink at least 8-10 cups of fluid a day. Drink enough fluid to keep your urine clear or pale yellow.  · Limit alcohol intake to no more than 1 drink a day for nonpregnant women and 2 drinks a day for men. One drink equals 12 oz of beer, 5 oz of wine, or 1½ oz of hard liquor.  Reading food labels  · Check Nutrition Facts on food labels for the amount of fat per serving. Choose foods with less than 3 grams of fat per serving.  Shopping  · Choose nonfat and low-fat healthy foods. Look for the words “nonfat,” “low fat,” or “fat free.”  · Avoid buying processed or prepackaged foods.  Cooking  · Cook using low-fat methods, such as baking, broiling, grilling, or boiling.  · Cook with small amounts of healthy fats, such as olive oil, grapeseed oil, canola oil, or sunflower oil.  What foods are recommended?    · All fresh, frozen, or canned fruits and vegetables.  · Whole grains.  · Low-fat or non-fat (skim) milk and yogurt.  · Lean meat, skinless poultry, fish, eggs, and beans.  · Low-fat protein supplement powders or  drinks.  · Spices and herbs.  What foods are not recommended?  · High-fat foods. These include baked goods, fast food, fatty cuts of meat, ice cream, french toast, sweet rolls, pizza, cheese bread, foods covered with butter, creamy sauces, or cheese.  · Fried foods. These include french fries, tempura, battered fish, breaded chicken, fried breads, and sweets.  · Foods with strong odors.  · Foods that cause bloating and gas.  Summary  · A low-fat diet can be helpful if you have a gallbladder condition, or before and after gallbladder surgery.  · Limit your fat intake to less than 30% of your total daily calories. This is about 60 g of fat if you eat 1,800 calories each day.  · Eat small, frequent meals throughout the day instead of three large meals.  This information is not intended to replace advice given to you by your health care provider. Make sure you discuss any questions you have with your health care provider.  Document Released: 12/23/2014 Document Revised: 01/25/2018 Document Reviewed: 01/25/2018  [employer] Interactive Patient Education © 2020 [employer]     Carac Counseling:  I discussed with the patient the risks of Carac including but not limited to erythema, scaling, itching, weeping, crusting, and pain.

## 2022-11-01 ENCOUNTER — OFFICE (AMBULATORY)
Dept: URBAN - METROPOLITAN AREA CLINIC 64 | Facility: CLINIC | Age: 62
End: 2022-11-01

## 2022-11-01 VITALS
SYSTOLIC BLOOD PRESSURE: 128 MMHG | HEART RATE: 65 BPM | WEIGHT: 193 LBS | DIASTOLIC BLOOD PRESSURE: 74 MMHG | HEIGHT: 70 IN

## 2022-11-01 DIAGNOSIS — Z79.899 OTHER LONG TERM (CURRENT) DRUG THERAPY: ICD-10-CM

## 2022-11-01 DIAGNOSIS — K21.9 GASTRO-ESOPHAGEAL REFLUX DISEASE WITHOUT ESOPHAGITIS: ICD-10-CM

## 2022-11-01 DIAGNOSIS — K22.70 BARRETT'S ESOPHAGUS WITHOUT DYSPLASIA: ICD-10-CM

## 2022-11-01 DIAGNOSIS — Z13.820 ENCOUNTER FOR SCREENING FOR OSTEOPOROSIS: ICD-10-CM

## 2022-11-01 PROCEDURE — 99214 OFFICE O/P EST MOD 30 MIN: CPT | Performed by: NURSE PRACTITIONER

## 2022-11-01 RX ORDER — ESOMEPRAZOLE MAGNESIUM 20 MG/1
20 CAPSULE, DELAYED RELEASE ORAL
Qty: 90 | Refills: 3 | Status: ACTIVE

## 2022-11-01 RX ORDER — FAMOTIDINE 40 MG/1
TABLET, FILM COATED ORAL
Qty: 90 | Refills: 0 | Status: ACTIVE